# Patient Record
Sex: FEMALE | Race: OTHER | HISPANIC OR LATINO | URBAN - METROPOLITAN AREA
[De-identification: names, ages, dates, MRNs, and addresses within clinical notes are randomized per-mention and may not be internally consistent; named-entity substitution may affect disease eponyms.]

---

## 2019-06-27 ENCOUNTER — EMERGENCY (EMERGENCY)
Facility: HOSPITAL | Age: 19
LOS: 1 days | Discharge: HOME | End: 2019-06-27
Attending: EMERGENCY MEDICINE | Admitting: EMERGENCY MEDICINE
Payer: SELF-PAY

## 2019-06-27 VITALS
OXYGEN SATURATION: 100 % | SYSTOLIC BLOOD PRESSURE: 105 MMHG | HEART RATE: 73 BPM | RESPIRATION RATE: 16 BRPM | TEMPERATURE: 98 F | DIASTOLIC BLOOD PRESSURE: 65 MMHG

## 2019-06-27 PROCEDURE — 99283 EMERGENCY DEPT VISIT LOW MDM: CPT | Mod: 25

## 2019-06-27 PROCEDURE — 12011 RPR F/E/E/N/L/M 2.5 CM/<: CPT

## 2019-06-27 RX ORDER — TETANUS TOXOID, REDUCED DIPHTHERIA TOXOID AND ACELLULAR PERTUSSIS VACCINE, ADSORBED 5; 2.5; 8; 8; 2.5 [IU]/.5ML; [IU]/.5ML; UG/.5ML; UG/.5ML; UG/.5ML
0.5 SUSPENSION INTRAMUSCULAR ONCE
Refills: 0 | Status: COMPLETED | OUTPATIENT
Start: 2019-06-27 | End: 2019-06-27

## 2019-06-27 RX ADMIN — TETANUS TOXOID, REDUCED DIPHTHERIA TOXOID AND ACELLULAR PERTUSSIS VACCINE, ADSORBED 0.5 MILLILITER(S): 5; 2.5; 8; 8; 2.5 SUSPENSION INTRAMUSCULAR at 18:24
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
## 2019-06-27 NOTE — ED PROCEDURE NOTE - ATTENDING CONTRIBUTION TO CARE
MD KAUR:  I was present for the critical portions of this procedure and provided direct attending supervision.

## 2019-06-27 NOTE — ED PROVIDER NOTE - LEFT FACE
1.5cm lac near angle of mandible left, sq tissue, not well approximated, not actively bleeding, not grossly contaminated, no fb visualized.  left forehead:  small contusion without palp underlying bony abn
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
## 2019-06-27 NOTE — ED PROVIDER NOTE - PLAN OF CARE
You were seen today and found to have a laceration.  This was repaired with stitches.  Be sure to keep the wound clean.  It is ok to shower as usual, but do not let the wound soak.  No pools, baths, or hot tubs.  Be sure to follow up with your primary care physician or the emergency department in 4 days for re-evaluation to see if the stitches are ready to come out.  RETURN TO THE EMERGENCY DEPARTMENT IMMEDIATELY IF YOU DEVELOP PUS DRAINING FROM THE WOUND, SPREADING REDNESS, OR FOR ANY OTHER CONCERN.

## 2019-06-27 NOTE — ED PROVIDER NOTE - CARE PLAN
Principal Discharge DX:	Facial laceration, initial encounter  Assessment and plan of treatment:	You were seen today and found to have a laceration.  This was repaired with stitches.  Be sure to keep the wound clean.  It is ok to shower as usual, but do not let the wound soak.  No pools, baths, or hot tubs.  Be sure to follow up with your primary care physician or the emergency department in 4 days for re-evaluation to see if the stitches are ready to come out.  RETURN TO THE EMERGENCY DEPARTMENT IMMEDIATELY IF YOU DEVELOP PUS DRAINING FROM THE WOUND, SPREADING REDNESS, OR FOR ANY OTHER CONCERN.  Secondary Diagnosis:	Contusion of left hand, initial encounter

## 2019-06-27 NOTE — ED PROVIDER NOTE - MUSCULOSKELETAL MINIMAL EXAM
left hand with ecchymoses to thenar eminence, no snuffbox tenderness, no 1st digit pain with axial load, distally nv intact, full rom wrist

## 2019-06-30 ENCOUNTER — EMERGENCY (EMERGENCY)
Facility: HOSPITAL | Age: 19
LOS: 1 days | Discharge: HOME | End: 2019-06-30
Admitting: EMERGENCY MEDICINE

## 2019-06-30 VITALS
SYSTOLIC BLOOD PRESSURE: 115 MMHG | HEART RATE: 68 BPM | TEMPERATURE: 98 F | DIASTOLIC BLOOD PRESSURE: 72 MMHG | RESPIRATION RATE: 16 BRPM | OXYGEN SATURATION: 100 %

## 2019-06-30 RX ORDER — CEPHALEXIN 500 MG
1 CAPSULE ORAL
Qty: 28 | Refills: 0
Start: 2019-06-30 | End: 2019-07-06

## 2019-06-30 RX ORDER — MUPIROCIN 20 MG/G
1 OINTMENT TOPICAL
Qty: 1 | Refills: 0
Start: 2019-06-30 | End: 2019-07-06

## 2019-06-30 RX ORDER — CEPHALEXIN 500 MG
500 CAPSULE ORAL ONCE
Refills: 0 | Status: COMPLETED | OUTPATIENT
Start: 2019-06-30 | End: 2019-06-30

## 2019-06-30 RX ADMIN — Medication 500 MILLIGRAM(S): at 17:59

## 2019-06-30 NOTE — ED PROVIDER NOTE - NSFOLLOWUPINSTRUCTIONS_ED_ALL_ED_FT
Apply Mupirocin ointment on the wound 3 times a day. Take Keflex 500mg four times a day for 1 week. Please continue all home medications as directed. See your regular doctor within 72 hours for follow-up care. Return to ER for new or worsening symptoms.

## 2019-06-30 NOTE — ED PROVIDER NOTE - CLINICAL SUMMARY MEDICAL DECISION MAKING FREE TEXT BOX
19 year old female with no known PMHx pw redness, itching and purulent drainage from suture site.   Plan: abx for localized cellulitis, mupirocin topical ointment
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
## 2019-07-01 PROBLEM — Z78.9 OTHER SPECIFIED HEALTH STATUS: Chronic | Status: ACTIVE | Noted: 2019-06-28

## 2022-03-31 NOTE — ED PROCEDURE NOTE - NS ED PROC PERFORMED BY1 FT
Wean O2 as tolerated to keep O2 saturations >88%  -  Repeat ECG without prolonged QTc- stopped Rocephin and azithro and transition to levaquin to complete course  - Continued prednisone to complete course  - Scheduled duonebs q 6 hrs  - currently compensated   Dr. Castro

## 2024-02-22 NOTE — ED PROVIDER NOTE - MDM ORDERS SUBMITTED SELECTION
The following labs were labeled with appropriate pt sticker and tubed to lab:     [x] Blue     [] Lavender   [] on ice  [] Green/yellow  [] Green/black [] on ice  [] Grey  [] on ice  [] Yellow  [] Red  [] Type/ Screen  [] ABG  [] VBG    [] COVID-19 swab    [] Rapid  [] PCR  [] Flu swab  [] Peds Viral Panel     [] Urine Sample  [] Fecal Sample  [] Pelvic Cultures  [] Blood Cultures  [] X 2  [] STREP Cultures     Medications
